# Patient Record
Sex: MALE | Race: WHITE | Employment: FULL TIME | URBAN - METROPOLITAN AREA
[De-identification: names, ages, dates, MRNs, and addresses within clinical notes are randomized per-mention and may not be internally consistent; named-entity substitution may affect disease eponyms.]

---

## 2019-07-02 ENCOUNTER — HOSPITAL ENCOUNTER (EMERGENCY)
Facility: CLINIC | Age: 24
Discharge: HOME | End: 2019-07-02
Attending: EMERGENCY MEDICINE | Admitting: EMERGENCY MEDICINE
Payer: COMMERCIAL

## 2019-07-02 VITALS
HEART RATE: 64 BPM | OXYGEN SATURATION: 99 % | WEIGHT: 135 LBS | SYSTOLIC BLOOD PRESSURE: 114 MMHG | RESPIRATION RATE: 17 BRPM | TEMPERATURE: 98.3 F | DIASTOLIC BLOOD PRESSURE: 77 MMHG

## 2019-07-02 DIAGNOSIS — R55 SYNCOPE AND COLLAPSE: ICD-10-CM

## 2019-07-02 LAB
ANION GAP SERPL CALCULATED.3IONS-SCNC: 8 MMOL/L (ref 3–14)
BASOPHILS # BLD AUTO: 0 10E9/L (ref 0–0.2)
BASOPHILS NFR BLD AUTO: 0.3 %
BUN SERPL-MCNC: 16 MG/DL (ref 7–30)
CALCIUM SERPL-MCNC: 8.6 MG/DL (ref 8.5–10.1)
CHLORIDE SERPL-SCNC: 105 MMOL/L (ref 94–109)
CO2 SERPL-SCNC: 24 MMOL/L (ref 20–32)
CREAT SERPL-MCNC: 0.83 MG/DL (ref 0.66–1.25)
DIFFERENTIAL METHOD BLD: NORMAL
EOSINOPHIL # BLD AUTO: 0.2 10E9/L (ref 0–0.7)
EOSINOPHIL NFR BLD AUTO: 2.4 %
ERYTHROCYTE [DISTWIDTH] IN BLOOD BY AUTOMATED COUNT: 11.9 % (ref 10–15)
GFR SERPL CREATININE-BSD FRML MDRD: >90 ML/MIN/{1.73_M2}
GLUCOSE SERPL-MCNC: 84 MG/DL (ref 70–99)
HCT VFR BLD AUTO: 47.2 % (ref 40–53)
HGB BLD-MCNC: 16.6 G/DL (ref 13.3–17.7)
IMM GRANULOCYTES # BLD: 0 10E9/L (ref 0–0.4)
IMM GRANULOCYTES NFR BLD: 0.3 %
INTERPRETATION ECG - MUSE: NORMAL
LYMPHOCYTES # BLD AUTO: 2.4 10E9/L (ref 0.8–5.3)
LYMPHOCYTES NFR BLD AUTO: 28 %
MCH RBC QN AUTO: 30.9 PG (ref 26.5–33)
MCHC RBC AUTO-ENTMCNC: 35.2 G/DL (ref 31.5–36.5)
MCV RBC AUTO: 88 FL (ref 78–100)
MONOCYTES # BLD AUTO: 0.6 10E9/L (ref 0–1.3)
MONOCYTES NFR BLD AUTO: 6.6 %
NEUTROPHILS # BLD AUTO: 5.3 10E9/L (ref 1.6–8.3)
NEUTROPHILS NFR BLD AUTO: 62.4 %
NRBC # BLD AUTO: 0 10*3/UL
NRBC BLD AUTO-RTO: 0 /100
PLATELET # BLD AUTO: 252 10E9/L (ref 150–450)
POTASSIUM SERPL-SCNC: 3.8 MMOL/L (ref 3.4–5.3)
RBC # BLD AUTO: 5.38 10E12/L (ref 4.4–5.9)
SODIUM SERPL-SCNC: 137 MMOL/L (ref 133–144)
TROPONIN I SERPL-MCNC: <0.015 UG/L (ref 0–0.04)
WBC # BLD AUTO: 8.6 10E9/L (ref 4–11)

## 2019-07-02 PROCEDURE — 25000128 H RX IP 250 OP 636: Performed by: EMERGENCY MEDICINE

## 2019-07-02 PROCEDURE — 85025 COMPLETE CBC W/AUTO DIFF WBC: CPT | Performed by: EMERGENCY MEDICINE

## 2019-07-02 PROCEDURE — 84484 ASSAY OF TROPONIN QUANT: CPT | Performed by: EMERGENCY MEDICINE

## 2019-07-02 PROCEDURE — 93005 ELECTROCARDIOGRAM TRACING: CPT

## 2019-07-02 PROCEDURE — 25000132 ZZH RX MED GY IP 250 OP 250 PS 637: Performed by: EMERGENCY MEDICINE

## 2019-07-02 PROCEDURE — 80048 BASIC METABOLIC PNL TOTAL CA: CPT | Performed by: EMERGENCY MEDICINE

## 2019-07-02 PROCEDURE — 90471 IMMUNIZATION ADMIN: CPT

## 2019-07-02 PROCEDURE — 96360 HYDRATION IV INFUSION INIT: CPT

## 2019-07-02 PROCEDURE — 99284 EMERGENCY DEPT VISIT MOD MDM: CPT | Mod: 25

## 2019-07-02 PROCEDURE — 90715 TDAP VACCINE 7 YRS/> IM: CPT | Performed by: EMERGENCY MEDICINE

## 2019-07-02 RX ORDER — SODIUM CHLORIDE 9 MG/ML
1000 INJECTION, SOLUTION INTRAVENOUS CONTINUOUS
Status: DISCONTINUED | OUTPATIENT
Start: 2019-07-02 | End: 2019-07-02 | Stop reason: HOSPADM

## 2019-07-02 RX ORDER — ASPIRIN 81 MG/1
324 TABLET, CHEWABLE ORAL ONCE
Status: COMPLETED | OUTPATIENT
Start: 2019-07-02 | End: 2019-07-02

## 2019-07-02 RX ADMIN — ASPIRIN 81 MG 324 MG: 81 TABLET ORAL at 02:13

## 2019-07-02 RX ADMIN — CLOSTRIDIUM TETANI TOXOID ANTIGEN (FORMALDEHYDE INACTIVATED), CORYNEBACTERIUM DIPHTHERIAE TOXOID ANTIGEN (FORMALDEHYDE INACTIVATED), BORDETELLA PERTUSSIS TOXOID ANTIGEN (GLUTARALDEHYDE INACTIVATED), BORDETELLA PERTUSSIS FILAMENTOUS HEMAGGLUTININ ANTIGEN (FORMALDEHYDE INACTIVATED), BORDETELLA PERTUSSIS PERTACTIN ANTIGEN, AND BORDETELLA PERTUSSIS FIMBRIAE 2/3 ANTIGEN 0.5 ML: 5; 2; 2.5; 5; 3; 5 INJECTION, SUSPENSION INTRAMUSCULAR at 02:14

## 2019-07-02 RX ADMIN — SODIUM CHLORIDE 1000 ML: 9 INJECTION, SOLUTION INTRAVENOUS at 02:13

## 2019-07-02 ASSESSMENT — ENCOUNTER SYMPTOMS
LIGHT-HEADEDNESS: 0
VOMITING: 0
WOUND: 1

## 2019-07-02 NOTE — ED TRIAGE NOTES
Pt reports 1 o2 episodes of syncope notes 5 glasses of wine, pt does not remember events, wound to chin

## 2019-07-02 NOTE — ED AVS SNAPSHOT
[facility] Emergency Department  [address]  [facility] [address]  Phone:  [phone]  Fax:  [phone]                                    Arsenio Adamson   MRN: [id number]    Department:  [facility] Emergency Department   Date of Visit:  7/2/2019           After Visit Summary Signature Page    I have received my discharge instructions, and my questions have been answered. I have discussed any challenges I see with this plan with the nurse or doctor.    ..........................................................................................................................................  Patient/Patient Representative Signature      ..........................................................................................................................................  Patient Representative Print Name and Relationship to Patient    ..................................................               ................................................  Date                                   Time    ..........................................................................................................................................  Reviewed by Signature/Title    ...................................................              ..............................................  Date                                               Time          22EPIC Rev 08/18

## 2019-07-02 NOTE — ED PROVIDER NOTES
"  History     Chief Complaint:  Syncope    The history is provided by the patient.      Arsenio Adamson is a 23 year old male who presents after syncope with amnesia. He notes consuming approximately 5 glasses of wine around dinner time and watched TV prior to going to bed. Patient states he woke up after an hour and his significant other reports hearing a \"thud\" while the patient was in the bathroom. Patient was found face down, sustaining a skin tear to his chin. Patient's significant other highlights patient likely had another syncopal episode shortly after coming to while seated. Patient acknowledges amnesia of the course of events tonight. Patient affirms history of recurring though infreqeunt syncopal events. He denies any emesis, lightheadedness, chest pain, additional complaints, dental concerns, tobacco use, or family history of sudden deaths. Per [facility], patient's prior Tdap was in 2007.    Allergies:  No Known Drug Allergies    Medications:    Medications reviewed. No current medications.     Past Medical History:    Medical history reviewed. No pertinent medical history.    Past Surgical History:    Surgical history reviewed. No pertinent surgical history.    Family History:    Family history reviewed. No pertinent family history.     Social History:  The patient was accompanied to the ED by his significant other, Karin.  Alcohol Use: Positive  Marital Status: Single     Review of Systems   HENT: Negative for dental problem.    Cardiovascular: Negative for chest pain.   Gastrointestinal: Negative for vomiting.   Skin: Positive for wound.   Neurological: Positive for syncope. Negative for light-headedness.   All other systems reviewed and are negative.    Physical Exam     Patient Vitals for the past 24 hrs:   BP Temp Temp src Pulse Heart Rate Resp SpO2 Weight   07/02/19 0217 114/77 -- -- 64 66 17 99 % --   07/02/19 0147 121/84 98.3  F (36.8  C) Temporal 58 58 16 95 % 61.2 kg (135 lb)     Physical " Exam  Constitutional:  Oriented to person, place, and time. Well-appearing.   HENT: Atraumatic  Head:    Normocephalic.   Mouth/Throat:   Oropharynx is clear and moist. No tenderness along his mandible   Eyes:    EOM are normal. Pupils are equal, round, and reactive to light.   Neck:    Neck supple.   Cardiovascular:  Normal rate, regular rhythm and normal heart sounds.      Exam reveals no gallop and no friction rub.       No murmur heard.  Pulmonary/Chest:  Effort normal and breath sounds normal.      No respiratory distress. No wheezes. No rales.      No reproducible chest wall pain.  Abdominal:   Soft. No distension. No tenderness. No rebound and no guarding.   Musculoskeletal:  Normal range of motion. No c-spine tenderness.   Neurological:   Alert and oriented to person, place, and time. GCS 15.          Moves all 4 extremities spontaneously    Skin:    No rash noted. No pallor. 1 cm abrasion to his right lower chin, no laceration.     Emergency Department Course     ECG:  ECG taken at 0153, ECG read at 0153  Sinus bradycardia with sinus arrhythmia  Otherwise normal ECG  Rate 57 bpm. ME interval 158 ms. QRS duration 106 ms. QT/QTc 418/406 ms. P-R-T axes 61 70 62.    Laboratory:  CBC: WBC 8.6 HGB 16.6   BMP: WNL (Creatinine 0.83)  Troponin (Collected 0155): <0.015    Interventions:  0213: NS 1L IV Bolus   0213: Aspirin 324 mg PO  0214: Adacel 0.5 mL IM    Emergency Department Course:  0153 Nursing notes and vitals reviewed. EKG obtained in the ED, see results above.   0154 I performed an exam of the patient as documented above.   0155 IV was inserted and blood was drawn for laboratory testing, results above.  0243 I personally reviewed the laboratory results with the patient and answered all related questions prior to discharge, anticipatory guidance given.     Impression & Plan      Medical Decision Making:  This patient presents with a history and clinical exam consistent with syncope.  The differential  "diagnosis includes head injury which could cause: skull fracture, epidural hematoma, subdural hematoma, intracerebral hemorrhage, and traumatic subarachnoid hemorrhage; all of these are highly unlikely in this clinical setting.  The differential for syncope is concerning for cardiac arrythmia, ACS, aortic stenosis, HOCM, PE, orthostatic hypotension, drugs, situational, carotid hypersensitivity, seizure, TIA, stroke.  There is no family history of sudden death, no chest pain, no seizure-like activity or post-ictal period, no murmur, and no signs of orthostasis in the ED, no focal neurologic symptoms, and no complaints of concerning headache.  The patient did not have prolonged LOC, sleepiness, repeated emesis, poor orientation, or significant irritability.  I have discussed the risk/benefit analysis with the patient regarding CT imaging.  We have decided to hold off on this at this time. Basic labs obtained fortunately returned negative and Troponin tonight was undetectable. The patient understand that they must return if any \"red flags\" appear/develop in the coming hours/days, as this may represent an indication to perform a CT scan.  I have noted that \"red flags\" include: repeated LOC,  headaches that get worse, increased drowsiness, strange behavior, repetitive speech, seizures, repeated vomiting, growing confusion, increased irritability, slurred speech, weakness or numbness, and loss of responsiveness.    Diagnosis:    ICD-10-CM   1. Syncope and collapse R55     Disposition: Home    Scribe Disclosure:  Arnold LORENZO, am serving as a scribe at 1:54 AM on 7/2/2019 to document services personally performed by Brandt Ruff MD based on my observations and the provider's statements to me.    [facility] EMERGENCY DEPARTMENT       Brandt Ruff MD  07/02/19 0443    "

## 2019-07-02 NOTE — DISCHARGE INSTRUCTIONS
Discharge Instructions  Syncope    Syncope (fainting) is a sudden, short loss of consciousness (passing out spell). People will usually fall to the ground when they faint or slump over if seated.  At this time your doctor does not find that your fainting spell is a sign of anything dangerous or life-threatening.  However, sometimes the signs of serious illness do not show up right away.  If you have new or worse symptoms, you may need to be seen again in the Emergency Department or by your primary doctor. Be sure to follow up as directed, or at least within 1 week.      Return to the Emergency Department if:  You faint again.   You have any significant bleeding.  You have chest pain or fast heartbeat, or if your heartbeat is irregular or skipping beats.  You feel short of breath.  You cough up any blood.  You have belly (abdominal) pain or unusual back pain.  You have ongoing vomiting (throwing up) or diarrhea, or have a black or tarry bowel movement or blood in the bowels or blood in your vomit.  You have a fever over 101 degrees.  You lose feeling or cannot move a part of your body or cannot talk normally.  You are confused, have a headache, cannot see well, or have a seizure.  DO NOT DRIVE. CALL 911 INSTEAD!    What can I do to help myself?  Follow any specific instructions that your provider discussed with you.  If you feel light-headed, make sure to sit down right away, even if you have to sit on the floor.  Follow up with your regular medical doctor as discussed for further management. This may include lowering your blood pressure medications, insulin or other diabetic medications, checking your blood sugar more frequently, and drinking more fluids, taking medicines for vomiting or diarrhea or getting up slower.  If you were given a prescription for medicine here today, be sure to read all of the information (including the package insert) that comes with your prescription.  This will include important  information about the medicine, its side effects, and any warnings that you need to know about.  The pharmacist who fills the prescription can provide more information and answer questions you may have about the medicine.  If you have questions or concerns that the pharmacist cannot address, please call or return to the Emergency Department.     Opioid Medication Information    Pain medications are among the most commonly prescribed medicines, so we are including this information for all our patients. If you did not receive pain medication or get a prescription for pain medicine, you can ignore it.     You may have been given a prescription for an opioid (narcotic) pain medicine and/or have received a pain medicine while here in the Emergency Department. These medicines can make you drowsy or impaired. You must not drive, operate dangerous equipment, or engage in any other dangerous activities while taking these medications. If you drive while taking these medications, you could be arrested for DUI, or driving under the influence. Do not drink any alcohol while you are taking these medications.     Opioid pain medications can cause addiction. If you have a history of chemical dependency of any type, you are at a higher risk of becoming addicted to pain medications.  Only take these prescribed medications to treat your pain when all other options have been tried. Take it for as short a time and as few doses as possible. Store your pain pills in a secure place, as they are frequently stolen and provide a dangerous opportunity for children or visitors in your house to start abusing these powerful medications. We will not replace any lost or stolen medicine.  As soon as your pain is better, you should flush all your remaining medication.     Many prescription pain medications contain Tylenol  (acetaminophen), including Vicodin , Tylenol #3 , Norco , Lortab , and Percocet .  You should not take any extra pills of  Tylenol  if you are using these prescription medications or you can get very sick.  Do not ever take more than 3000 mg of acetaminophen in any 24 hour period.    All opioids tend to cause constipation. Drink plenty of water and eat foods that have a lot of fiber, such as fruits, vegetables, prune juice, apple juice and high fiber cereal.  Take a laxative if you don t move your bowels at least every other day. Miralax , Milk of Magnesia, Colace , or Senna  can be used to keep you regular.      Remember that you can always come back to the Emergency Department if you are not able to see your regular doctor in the amount of time listed above, if you get any new symptoms, or if there is anything that worries you.

## 2020-03-11 ENCOUNTER — HEALTH MAINTENANCE LETTER (OUTPATIENT)
Age: 25
End: 2020-03-11

## 2021-01-03 ENCOUNTER — HEALTH MAINTENANCE LETTER (OUTPATIENT)
Age: 26
End: 2021-01-03

## 2021-04-25 ENCOUNTER — HEALTH MAINTENANCE LETTER (OUTPATIENT)
Age: 26
End: 2021-04-25

## 2021-10-10 ENCOUNTER — HEALTH MAINTENANCE LETTER (OUTPATIENT)
Age: 26
End: 2021-10-10

## 2022-05-21 ENCOUNTER — HEALTH MAINTENANCE LETTER (OUTPATIENT)
Age: 27
End: 2022-05-21

## 2022-09-18 ENCOUNTER — HEALTH MAINTENANCE LETTER (OUTPATIENT)
Age: 27
End: 2022-09-18

## 2023-06-04 ENCOUNTER — HEALTH MAINTENANCE LETTER (OUTPATIENT)
Age: 28
End: 2023-06-04